# Patient Record
Sex: FEMALE | URBAN - METROPOLITAN AREA
[De-identification: names, ages, dates, MRNs, and addresses within clinical notes are randomized per-mention and may not be internally consistent; named-entity substitution may affect disease eponyms.]

---

## 2019-02-13 ENCOUNTER — DOCUMENTATION (OUTPATIENT)
Dept: PHYSICAL THERAPY | Facility: OTHER | Age: 34
End: 2019-02-13

## 2019-02-13 NOTE — PROGRESS NOTES
This Pt was sent to our call center, but was looking for a orthopedic doctor  This nurse did review in detail the comp  spine program and Pt  did refuse, asking to be transferred back to ortho call center  Pt  was transferred